# Patient Record
Sex: MALE | Race: WHITE | NOT HISPANIC OR LATINO
[De-identification: names, ages, dates, MRNs, and addresses within clinical notes are randomized per-mention and may not be internally consistent; named-entity substitution may affect disease eponyms.]

---

## 2023-05-12 ENCOUNTER — TRANSCRIPTION ENCOUNTER (OUTPATIENT)
Age: 28
End: 2023-05-12

## 2023-05-12 ENCOUNTER — NON-APPOINTMENT (OUTPATIENT)
Age: 28
End: 2023-05-12

## 2023-05-15 PROBLEM — Z00.00 ENCOUNTER FOR PREVENTIVE HEALTH EXAMINATION: Status: ACTIVE | Noted: 2023-05-15

## 2023-05-16 ENCOUNTER — APPOINTMENT (OUTPATIENT)
Dept: OTOLARYNGOLOGY | Facility: CLINIC | Age: 28
End: 2023-05-16
Payer: COMMERCIAL

## 2023-05-16 VITALS — BODY MASS INDEX: 32.9 KG/M2 | WEIGHT: 235 LBS | HEIGHT: 71 IN

## 2023-05-16 PROCEDURE — 99204 OFFICE O/P NEW MOD 45 MIN: CPT | Mod: 25

## 2023-05-16 PROCEDURE — 31231 NASAL ENDOSCOPY DX: CPT

## 2023-05-16 RX ORDER — AZELASTINE HYDROCHLORIDE 137 UG/1
137 SPRAY, METERED NASAL
Qty: 1 | Refills: 5 | Status: ACTIVE | COMMUNITY
Start: 2023-05-16 | End: 1900-01-01

## 2023-05-16 NOTE — ASSESSMENT
[FreeTextEntry1] : It was my impression that he has the complaints of nasal obstruction status post septoplasty and inferior turbinate reductions that helped temporarily, allergic rhinitis and snoring and possible sleep apnea.  He has had orthodonture x2 and has had relatively underdeveloped midface.  It seems likely that his problems are multifactorial.  As he is helped when he uses Afrin on rare occasions, it seems likely that he has a mucosal congestion component.  I recommended twice daily fluticasone and azelastine to see if that would be helpful enough, if not I would suggest office inferior turbinate reductions.\par He has snoring and possible sleep apnea and I recommended a home sleep study and would like to see him back in follow-up afterwards.\par Some of both of these issues is related to the underdevelopment face and he had been recommended to have a maxillary advancement.  This certainly could help both of the above issues and he may also do well with an oral appliance and palate expander.  These were all discussed at length with the patient.

## 2023-05-16 NOTE — HISTORY OF PRESENT ILLNESS
[de-identified] : KHAI CAN is a 28 year old male who comes in complaining of a relatively constant nasal congestion now for 9 months.  If he does use Afrin, which she does not use regularly, his breathing is improved.  He snores at night and may obstruct.  He had an adenoidectomy and later septoplasty and turbinate reduction with temporary improvement.  He does have environmental allergies.  The patient had no other ear nose or throat complaints at this visit.

## 2023-05-16 NOTE — PHYSICAL EXAM
[FreeTextEntry1] : \par The patient was alert and oriented and in no distress.\par Voice was clear.\par \par Face:\par The patient had no facial asymmetry or mass.\par The skin was unremarkable.  He had a relatively underdeveloped midface\par \par Eyes:\par The pupils were equal round and reactive to light and accommodation.\par There was no significant nystagmus or disconjugate gaze noted.\par \par Nose: \par The external nose had no significant deformity.  There was no facial tenderness.  On anterior rhinoscopy, the nasal mucosa was clear.  The anterior septum was midline.  There were no visualized polyps purulence  or masses.\par Greenlee maneuver is negative\par \par Oral cavity:\par The oral mucosa was normal.\par The oral and base of tongue were clear and without mass.\par The gingival and buccal mucosa were moist and without lesions.\par The palate moved well.\par There was no cleft to the palate.\par There appeared to be good salivary flow.  \par There was no pus, erythema or mass in the oral cavity.\par He has large noninjected tonsils.\par \par \par Ears:\par The external ears were normal without deformity.\par The ear canals were clear.\par The tympanic membranes were intact and normal.\par \par Neck: \par The neck was symmetrical.\par The parotid and submandibular glands were normal without masses.\par The trachea was midline and there was no unusual crepitus.\par The thyroid was smooth and nontender and no masses were palpated.\par There was no significant cervical adenopathy.\par \par \par Neuro:\par Neurologically, the patient was awake, alert, and oriented to person, place and time. There were no obvious focal neurologic abnormalities.  Cranial nerves II through XII were grossly intact.\par \par \par TMJ:\par The temporomandibular joints were nontender.\par There was no abnormal crepitus and no significant malocclusion\par  [de-identified] : Nasal endoscopy: \par CPT 64252\par Procedure Note:\par \par Endoscopy was done with Covid precautions and with video. All risks and benefits were discussed with the patient and consent obtained.\par \par Nasal endoscopy was done with topical anesthesia of Pontocaine and Afrin and a      nasal endoscope.\par Indication: Nasal congestion, rule out sinusitis.\par Procedure: The nasal cavity was anesthetized with topical Afrin and Pontocaine. An  endoscope was used and inserted into the nasal cavity.\par Attention was first paid to the anterior nasal cavity.\par Endocoscopy was performed to inspect the interior of the nasal cavity, the nasal septum,  the middle and superior meati, the inferior, middle and superior turbinates, and the spheno-ethmoidal  recesses, the nasopharynx and eustachian tube orifices bilaterally. including the nasal mocosa, the possibility of polyps and the consistency of the nasal mucous.\par All findings were normal except:\par He had markedly boggy violaceous nasal mucosa with significant inferior turbinate hypertrophy and the inferior turbinate touching the septum and several areas with a moderate persistent S-shaped septal deflection.\par \par Flexible fiberoptic laryngoscopy: CPT 10844\par Indications: Dysphagia\par Procedure note:\par \par Flexible fiberoptic laryngoscopy was performed because of dysphagia and because of the patient's inability to tolerate adequate mirror examination.\par \par The nasal cavity was anesthetized with Pontocaine and Afrin.\par The flexible endoscope was placed into the patient's nasal cavity.\par The nasopharynx was without masses.\par The oropharynx, vallecula and base of tongue had no masses.\par The epiglottis, aryepiglottic folds and false vocal cords were normal.\par The pyriform sinuses were without mucosal lesions or pooling of secretions.  \par The laryngeal ventricles were without lesions.\par The visualized subglottis was without mass.\par The lateral and posterior pharyngeal walls were clear and symmetrical.\par The vocal folds were clear and mobile; they abducted and adducted normally.\par There was no interarytenoid mass or fullness.\par \par Endoscopy was done with Covid precautions and with video. All risks and benefits were discussed with the patient and consent obtained.\par He had a small oropharyngeal airway

## 2023-05-16 NOTE — REASON FOR VISIT
[Initial Consultation] : an initial consultation for [FreeTextEntry2] : trouble breathing through nose, stuffy nose, snoring

## 2023-06-13 ENCOUNTER — APPOINTMENT (OUTPATIENT)
Dept: OTOLARYNGOLOGY | Facility: CLINIC | Age: 28
End: 2023-06-13
Payer: COMMERCIAL

## 2023-06-13 PROCEDURE — 31231 NASAL ENDOSCOPY DX: CPT

## 2023-06-13 PROCEDURE — 99213 OFFICE O/P EST LOW 20 MIN: CPT | Mod: 25

## 2023-06-13 RX ORDER — FLUTICASONE PROPIONATE 50 UG/1
50 SPRAY, METERED NASAL
Qty: 3 | Refills: 3 | Status: ACTIVE | COMMUNITY
Start: 2023-06-13 | End: 1900-01-01

## 2023-06-21 NOTE — PHYSICAL EXAM
[FreeTextEntry1] : \par The patient was alert and oriented and in no distress.\par Voice was clear.\par \par Face:\par The patient had no facial asymmetry or mass.\par The skin was unremarkable.\par \par Eyes:\par The pupils were equal round and reactive to light and accommodation.\par There was no significant nystagmus or disconjugate gaze noted.\par \par Nose: \par The external nose had no significant deformity.  There was no facial tenderness.  On anterior rhinoscopy, the nasal mucosa was clear.  The anterior septum was midline.  There were no visualized polyps purulence  or masses.\par \par Oral cavity:\par The oral mucosa was normal.\par The oral and base of tongue were clear and without mass.\par The gingival and buccal mucosa were moist and without lesions.\par The palate moved well.\par There was no cleft to the palate.\par There appeared to be good salivary flow.  \par There was no pus, erythema or mass in the oral cavity.\par \par \par Ears:\par The external ears were normal without deformity.\par The ear canals were clear.\par The tympanic membranes were intact and normal.\par \par Neck: \par The neck was symmetrical.\par The parotid and submandibular glands were normal without masses.\par The trachea was midline and there was no unusual crepitus.\par The thyroid was smooth and nontender and no masses were palpated.\par There was no significant cervical adenopathy.\par \par \par Neuro:\par Neurologically, the patient was awake, alert, and oriented to person, place and time. There were no obvious focal neurologic abnormalities.  Cranial nerves II through XII were grossly intact.\par \par \par TMJ:\par The temporomandibular joints were nontender.\par There was no abnormal crepitus and no significant malocclusion\par \par \par Pertinent findings were limited to his nasal endoscopy nasal endoscopy: \par CPT 16685\par Procedure Note:\par \par Endoscopy was done with Covid precautions and with video. All risks and benefits were discussed with the patient and consent obtained.\par \par Nasal endoscopy was done with topical anesthesia of Pontocaine and Afrin and a      nasal endoscope.\par Indication: Nasal congestion, evaluation of response to therapy\par Procedure: The nasal cavity was anesthetized with topical Afrin and Pontocaine. An  endoscope was used and inserted into the nasal cavity.\par Attention was first paid to the anterior nasal cavity.\par Endocoscopy was performed to inspect the interior of the nasal cavity, the nasal septum,  the middle and superior meati, the inferior, middle and superior turbinates, and the spheno-ethmoidal  recesses, the nasopharynx and eustachian tube orifices bilaterally. including the nasal mocosa, the possibility of polyps and the consistency of the nasal mucous.\par All findings were normal except:\par The nasal mucosa remained somewhat boggy but the airway was improved.  The turbinates had been reduced in the middle meati were open without polyps purulence or masses\par \par

## 2023-06-21 NOTE — ASSESSMENT
[FreeTextEntry1] : It was my impression that he has the chronic rhinitis, largely atopic and has responded quite well to the combination of twice daily Flonase and azelastine.  We discussed the option of trying to get Dymista.  But otherwise he is doing quite well and I told him he could still add an oral antihistamine as needed.  He has had an allergy evaluation and desensitization was recommended and I would do that if his symptoms warrant.  It looks like he will not need a inferior turbinate reduction and I will speak to him after his sleep study results are in

## 2023-06-21 NOTE — HISTORY OF PRESENT ILLNESS
[de-identified] : KHAI CAN was seen in follow-up on June 13.  With a combination of twice daily Flonase and azelastine he was doing much better.  His notes his nasal airway is significantly improved.  He may be snoring less.  He just returned the sleep study this morning.  He has the history of nasal allergies and had an allergy evaluation 8 to 10 years ago.  He has not needed oral antihistamines any longer the patient had no other ear nose or throat complaints at this visit.

## 2023-06-27 ENCOUNTER — APPOINTMENT (OUTPATIENT)
Dept: OTOLARYNGOLOGY | Facility: CLINIC | Age: 28
End: 2023-06-27
Payer: COMMERCIAL

## 2023-06-27 DIAGNOSIS — J34.3 HYPERTROPHY OF NASAL TURBINATES: ICD-10-CM

## 2023-06-27 DIAGNOSIS — J31.0 CHRONIC RHINITIS: ICD-10-CM

## 2023-06-27 DIAGNOSIS — R06.83 SNORING: ICD-10-CM

## 2023-06-27 PROCEDURE — 99213 OFFICE O/P EST LOW 20 MIN: CPT | Mod: 95

## 2023-06-27 RX ORDER — AZELASTINE HYDROCHLORIDE 137 UG/1
137 SPRAY, METERED NASAL
Qty: 3 | Refills: 3 | Status: ACTIVE | COMMUNITY
Start: 2023-06-27 | End: 1900-01-01

## 2023-06-27 NOTE — ASSESSMENT
[FreeTextEntry1] : It was my impression that his findings are borderline for sleep apnea.  I reviewed the findings with him.  I would not suggest CPAP.  He likely would benefit from an oral appliance and this was recommended.  Improving his nasal airway could also be enough but not for sure.  I suggested continuing with Flonase and azelastine and recommended an allergy evaluation and would like to see him back in follow-up afterwards to see if the nasal airway should be further addressed

## 2023-06-27 NOTE — HISTORY OF PRESENT ILLNESS
[de-identified] : KHAI CAN was seen by 2 way telemedicine on June 27.  I was in the office at [facility]. [address] [place] [address] and he was at the library at [facility].  In the interim, he had a home sleep study.  There were 330 minutes that were evaluated.  He had an apnea-hypopnea index of 9.9 and a luca saturation of 88.  These were reviewed with him.  Additionally, he notes that he has had significant improvement in his nasal airway with a combination of fluticasone and azelastine.  He has not yet had an allergy evaluation.  The patient had no other ear nose or throat complaints at this visit.

## 2023-06-27 NOTE — PHYSICAL EXAM
[FreeTextEntry1] : General:\par The patient was alert and oriented and in no distress.\par Voice was clear.  No physical examination could be done by telemedicine

## 2024-05-19 ENCOUNTER — RX RENEWAL (OUTPATIENT)
Age: 29
End: 2024-05-19

## 2024-05-19 RX ORDER — FLUTICASONE PROPIONATE 50 UG/1
50 SPRAY, METERED NASAL
Qty: 1 | Refills: 4 | Status: ACTIVE | COMMUNITY
Start: 2023-05-16 | End: 1900-01-01

## 2025-09-03 ENCOUNTER — RX RENEWAL (OUTPATIENT)
Age: 30
End: 2025-09-03